# Patient Record
Sex: FEMALE | Race: WHITE
[De-identification: names, ages, dates, MRNs, and addresses within clinical notes are randomized per-mention and may not be internally consistent; named-entity substitution may affect disease eponyms.]

---

## 2018-03-21 ENCOUNTER — HOSPITAL ENCOUNTER (INPATIENT)
Dept: HOSPITAL 14 - H.EROB2 | Age: 23
LOS: 3 days | Discharge: HOME | DRG: 370 | End: 2018-03-24
Attending: OBSTETRICS & GYNECOLOGY | Admitting: OBSTETRICS & GYNECOLOGY
Payer: COMMERCIAL

## 2018-03-21 VITALS — BODY MASS INDEX: 36.6 KG/M2

## 2018-03-21 DIAGNOSIS — O34.211: ICD-10-CM

## 2018-03-21 DIAGNOSIS — Z3A.39: ICD-10-CM

## 2018-03-21 DIAGNOSIS — N85.8: ICD-10-CM

## 2018-03-21 LAB
BASOPHILS # BLD AUTO: 0 K/UL (ref 0–0.2)
BASOPHILS NFR BLD: 0.3 % (ref 0–2)
EOSINOPHIL # BLD AUTO: 0.1 K/UL (ref 0–0.7)
EOSINOPHIL NFR BLD: 0.6 % (ref 0–4)
ERYTHROCYTE [DISTWIDTH] IN BLOOD BY AUTOMATED COUNT: 17.4 % (ref 11.5–14.5)
HGB BLD-MCNC: 9.8 G/DL (ref 12–16)
LYMPHOCYTES # BLD AUTO: 1.8 K/UL (ref 1–4.3)
LYMPHOCYTES NFR BLD AUTO: 17.3 % (ref 20–40)
MCH RBC QN AUTO: 23.3 PG (ref 27–31)
MCHC RBC AUTO-ENTMCNC: 31.6 G/DL (ref 33–37)
MCV RBC AUTO: 73.8 FL (ref 81–99)
MONOCYTES # BLD: 0.6 K/UL (ref 0–0.8)
MONOCYTES NFR BLD: 6.2 % (ref 0–10)
NEUTROPHILS # BLD: 7.7 K/UL (ref 1.8–7)
NEUTROPHILS NFR BLD AUTO: 75.6 % (ref 50–75)
NRBC BLD AUTO-RTO: 0 % (ref 0–0)
PLATELET # BLD: 249 K/UL (ref 130–400)
PMV BLD AUTO: 8.3 FL (ref 7.2–11.7)
RBC # BLD AUTO: 4.23 MIL/UL (ref 3.8–5.2)
WBC # BLD AUTO: 10.2 K/UL (ref 4.8–10.8)

## 2018-03-21 PROCEDURE — 4A1HXCZ MONITORING OF PRODUCTS OF CONCEPTION, CARDIAC RATE, EXTERNAL APPROACH: ICD-10-PCS | Performed by: OBSTETRICS & GYNECOLOGY

## 2018-03-21 RX ADMIN — SIMETHICONE CHEW TAB 80 MG SCH MG: 80 TABLET ORAL at 22:06

## 2018-03-21 RX ADMIN — OXYCODONE HYDROCHLORIDE AND ACETAMINOPHEN PRN TAB: 5; 325 TABLET ORAL at 19:47

## 2018-03-21 NOTE — OP
PROCEDURE DATE:  2018



PREOPERATIVE DIAGNOSES:  Intrauterine pregnancy at 39 weeks 6 days, history

of previous  section, active labor, 5 cm dilated, and declined

trial of labor.



PROCEDURE:  Repeat low-flap transverse  section via Pfannenstiel

skin incision.



SURGEON:  Jo-Ann Rogers MD



ASSISTANT:  Puma Álvarez DO, he was helpful in creating exposure, obtaining

hemostasis, delivery of the infant and closure of the patient.  Procedure

would not have been possible without his assistance.



TYPE OF ANESTHESIA:  Spinal.



ANESTHESIA ADMINISTERED BY:  Joey Zelaya MD



ESTIMATED BLOOD LOSS:  800 mL



URINE OUTPUT:  Rutledge catheter put out approximately 250 mL.



IV FLUID INTAKE:  The patient received approximately 1200 mL of D5 LR

intraoperatively.



OPERATIVE FINDINGS:  Baby, vertex presentation, Apgars 9 and 9, weighing

3440 g, and normal uterus, tubes and ovaries were identified.



DESCRIPTION OF PROCEDURE:  After informed consent was obtained, the patient

was taken to the operating room where she was given spinal anesthesia.  The

patient was then prepped and draped in a normal sterile fashion.  A

Pfannenstiel skin incision was then made with a scalpel and carried down to

the underlying layer of the fascia.  The fascia was nicked in the midline. 

The fascial incision was then extended laterally with the curved Malik

scissors.  Superior aspect of the fascial incision was then grasped with

Kocher clamps, elevated up, and the rectus muscles were dissected off using

both sharp and blunt dissection.  Attention was then turned to the inferior

aspect of the fascial incision, which in a similar fashion was grasped with

Kocher clamps, elevated up, and the rectus muscles were dissected off using

both sharp and blunt dissection.  The rectus muscles were then  in

the midline.  The peritoneum identified and then entered sharply with the

Metzenbaum scissors.  The peritoneal incision was then extended superiorly

and inferiorly with good visualization of the bladder.  The bladder blade

was inserted.  The vesicouterine peritoneum was identified and entered

sharply with the Metzenbaum scissors.  The incision was then extended

laterally.  The bladder flap was created digitally.  The bladder blade was

then readjusted.  A low-transverse incision was then made with the scalpel.

The uterine incision was then extended laterally with the bandage scissors.

The infant was then delivered atraumatically.  The nose and mouth were

suctioned with Delee suction trap.  The cord was clamped and cut.  The

infant was handed off to awaiting pediatricians.  The placenta was then

removed manually.  The uterus was exteriorized and cleared of all clots and

debris.  The uterine incision was repaired with 0 Vicryl in a running

fashion.  The second layer of the same suture was used to obtain excellent

hemostasis.  The abdomen was then copiously irrigated.  The irrigant was

removed with a suction device.  Hemostasis was noted.  The gutters were

then cleared off all clots and debris.  The uterus was then returned to the

abdomen.  The incision was examined.  Hemostasis was noted.  The peritoneum

was then closed with 2-0 Vicryl in a running fashion.  The muscle was

re-approximated with 0 Vicryl in an interrupted fashion.  The fascia was

closed with 0 Vicryl in a running fashion and the skin was closed with 4-0

on a Josiah needle.  All sponge, lap, needle, and instrument counts were

correct x2 and the patient was taken to the recovery room in awake and

stable condition.





__________________________________________

Jo-Ann Rogers MD





DD:  2018 17:06:54

DT:  2018 19:25:10

Job # [id number]





ALEXSANDRA

## 2018-03-21 NOTE — OBDS
===================================

DELIVERY PERSONNEL

===================================

   

Nurse Midwife Certified:  na

Delivery Doctor:  JUDSON Rogers MD

Scrub Nurse:  Linda Landrum OBT

Circulator:  Sherry Jean RN/Sabrina Bullock

Anesthesiologist:  

Anesthetist:  michael

Resident:  

   

===================================

MATERNAL INFORMATION

===================================

   

Delivery Anesthesia:  Spinal

Medications in Delivery:  Pitocin 30 units in 500 cc LR at 999 cc/hr.

Estimated  Blood Loss (ml):  800

Placenta Cultured:  No

Maternal Complications:  None

Provider Comments:  See operative report

   

===================================

LABOR SUMMARY

===================================

   

EDC:  2018 00:00

No. Babies in Womb:  1

 Attempted:  No

Labor Anesthesia:  None

   

===================================

LABOR INFORMATION

===================================

   

Reason for Induction:  Not Applicable

Onset of Labor:  2018 21:30

Oxytocin:  N/A

Group B Beta Strep:  Negative

Steroids Given:  None

Reason Steroids Not Administered:  Not Applicable

   

===================================

MEMBRANES

===================================

   

Membranes Rupture Method:  Artificial

Rupture of Membranes:  2018 13:41

Length of Rupture (hrs):  0.02

Amniotic Fluid Color:  Clear

Amniotic Fluid Amount:  Small

Amniotic Fluid Odor:  Normal

   

===================================

STAGES OF LABOR

===================================

   

Stage 3 hrs:  0

Stage 3 min:  1

Total Time in Labor hrs:  16

Total Time in Labor min:  13

   

===================================

CSECTION DELIVERY

===================================

   

Primary Indication:  Repeat Elective

Secondary Indication:  Repeat Elective

CSection Urgency:  Elective

CSection Incidence:  Repeat

Labor:  N/A

Elective:  Elective

CSection Incision:  Lower Uterine Transverse

   

===================================

BABY A INFORMATION

===================================

   

Infant Delivery Date/Time:  2018 13:42

Method of Delivery:  

Born in Route :  No

:  N/A

Forceps:  N/A

Vacuum Extraction:  N/A

Shoulder Dystocia :  No

   

===================================

SHOULDER DYSTOCIA BABY A

===================================

   

Infant Delivery Date/Time:  2018 13:42

   

===================================

PRESENTATION/POSITION BABY A

===================================

   

Presentation:  Cephalic

Breech Presentation:  N/A

   

===================================

PLACENTA INFORMATION BABY A

===================================

   

Placenta Delivery Time :  2018 13:43

Placenta Method of Delivery:  Manual Removal

Placenta Status:  Delivered

   

===================================

APGAR SCORES BABY A

===================================

   

Heart Rate 1 min:  >100 bpm

Resp Effort 1 min:  Good Cry

Reflex Irritability 1 min:  Cough or Sneeze or Pulls Away

Muscle Tone 1 min:  Active Motion

Color 1 min:  Body Pink, Extremities Blue

Resuscitation Effort 1 min:  Tactile Stimulation

APGAR SCORE 1 MIN:  9

Heart Rate 5 min:  >100 bpm

Resp Effort 5 min:  Good Cry

Reflex Irritability 5 min:  Cough or Sneeze or Pulls Away

Muscle Tone 5 min:  Active Motion

Color 5 min:  Body Pink, Extremities Blue

Resuscitation Effort 5 min:  Tactile Stimulation

APGAR SCORE 5 MIN:  9

   

===================================

INFANT INFORMATION BABY A

===================================

   

Gestational Age at Delivery:  39.6

Gestational Status:  Term

Infant Outcome :  Liveborn

Infant Condition :  Stable

Infant Sex:  Male

   

===================================

IDENTIFICATION/MEDS BABY A

===================================

   

ID Band Number:  [id number]

ID Band Location:  Left Leg; Left Arm



Vitamin K Given :  Not Given

Erythromycin Given:  Not Given

   

===================================

WEIGHT/LENGTH BABY A

===================================

   

Infant Birthweight (gms):  3440

Infant Weight (lb):  7

Infant Weight (oz):  9

Infant Length Inches:  20.50

Infant Length cms:  52.1

   

===================================

CORD INFORMATION BABY A

===================================

   

No. Cord Vessels:  3

Nuchal Cord :  Around Neck x1, Loose

Cord Blood Taken:  Yes

Infant Suction:  Mouth

   

===================================

ASSESSMENT BABY A

===================================

   

Infant Complications:  None

Physical Findings at Delivery:  Within Normal Limits

Infant Respirations:  Appears Normal

Neonatologist/ALS Called :  No

Infant Care By:  /Sabrina Tristan

Transferred To:  Remains with Mother

## 2018-03-22 LAB
ERYTHROCYTE [DISTWIDTH] IN BLOOD BY AUTOMATED COUNT: 17.3 % (ref 11.5–14.5)
HGB BLD-MCNC: 8.9 G/DL (ref 12–16)
MCH RBC QN AUTO: 23.2 PG (ref 27–31)
MCHC RBC AUTO-ENTMCNC: 32.4 G/DL (ref 33–37)
MCV RBC AUTO: 71.6 FL (ref 81–99)
PLATELET # BLD: 199 K/UL (ref 130–400)
RBC # BLD AUTO: 3.86 MIL/UL (ref 3.8–5.2)
WBC # BLD AUTO: 13.4 K/UL (ref 4.8–10.8)

## 2018-03-22 RX ADMIN — OXYCODONE HYDROCHLORIDE AND ACETAMINOPHEN PRN TAB: 5; 325 TABLET ORAL at 04:08

## 2018-03-22 RX ADMIN — SIMETHICONE CHEW TAB 80 MG SCH MG: 80 TABLET ORAL at 10:19

## 2018-03-22 RX ADMIN — SIMETHICONE CHEW TAB 80 MG SCH MG: 80 TABLET ORAL at 21:34

## 2018-03-22 RX ADMIN — SIMETHICONE CHEW TAB 80 MG SCH MG: 80 TABLET ORAL at 16:56

## 2018-03-22 RX ADMIN — SIMETHICONE CHEW TAB 80 MG SCH MG: 80 TABLET ORAL at 03:50

## 2018-03-22 RX ADMIN — OXYCODONE HYDROCHLORIDE AND ACETAMINOPHEN PRN TAB: 5; 325 TABLET ORAL at 19:34

## 2018-03-22 NOTE — OBPPN
===========================

Datetime: 2018 06:54

===========================

   

PP Pain Prov:  Within normal limits

PP Nausea Prov:  Denies

PP Flatus Prov:  No

PP BM Prov:  No

PP Heart Prov:  Normal

PP Lungs Prov:  Normal

PP Abdomen/Uterus Prov:  Normal

PP Lochia Prov:  Normal

PP CVA Tenderness Prov:  Normal

PP Extremities Prov:  Normal

PP C/S Incision Prov:  Normal

PP Breastfeeding Progress Prov:  Normal

PP Impression Prov:  Normal postpartum progression

PP Plan Prov:  Continue present management

PP Progress Note Prov:  21 y/o F now , s/p  on 18. Pt reports feeling well. Pain 
around surgical incision and pelvis is well controlled with medication. Pt afebrile, vomited once las
t night at 9pm after Cranberry juice intake. Pt has not ambulated. Pt voiding thorugh Rutledge, not pass
ing gases and NO bowel movement yet. Pt breastfeeding with NO difficulties. Lochia same as menses. Pt
 denies headache, dizziness, CP, SOB, N/V or calf tenderness

   All systems reviewed and negative except as above.

      

   O:

   PE: 

   -Gen: A_O, resting comfortably on bed, NAD.

   -Lungs: CTAB, No W/R/R.

   -CV: RRR, S1 and S2 present.

   -ABD: soft, BS +, firm fundus below umbilicus. Incision on dressing that is clean, dry and intact

   -EXT: No cyanosis, non-tender calves.

      

   A/P:

   21 y/o F on POD 1, stable, recovering well from .

   --Incision dressing to be removed later today.

   --Continue with post-partum management.

   --Breastfeeding and ambulation encouraged. 

   --Anticipated discharge 18. 

   Case discussed with OB on call.

   Carrie PGY-1

      

   OB Hospitalist Addendum: Pt seen and examined by me.  Agree w/ above.  POD 1 s/p Repeat c/s, doing
 well, breast and bottle feeding.  Incision w/ bandage in place.  Encourage OOB today.  (ES)    

IP PP Procedures:  None

Vital Signs Provider PP:  Reviewed

## 2018-03-23 RX ADMIN — SIMETHICONE CHEW TAB 80 MG SCH MG: 80 TABLET ORAL at 04:45

## 2018-03-23 RX ADMIN — SIMETHICONE CHEW TAB 80 MG SCH MG: 80 TABLET ORAL at 09:46

## 2018-03-23 RX ADMIN — SIMETHICONE CHEW TAB 80 MG SCH MG: 80 TABLET ORAL at 21:40

## 2018-03-23 RX ADMIN — SIMETHICONE CHEW TAB 80 MG SCH MG: 80 TABLET ORAL at 18:10

## 2018-03-23 NOTE — OBPPN
===========================

Datetime: 2018 06:36

===========================

   

PP Pain Prov:  Within normal limits

PP Nausea Prov:  Denies

PP Flatus Prov:  Yes

PP BM Prov:  No

PP Heart Prov:  Normal

PP Lungs Prov:  Normal

PP Abdomen/Uterus Prov:  Normal

PP Lochia Prov:  Normal

PP CVA Tenderness Prov:  Normal

PP Extremities Prov:  Normal

PP C/S Incision Prov:  Not Applicable

PP Breastfeeding Progress Prov:  Normal

PP Impression Prov:  Normal postpartum progression

PP Plan Prov:  Continue present management

PP Progress Note Prov:  23 y/o F now , s/p  on 18. Pt reports feeling well. Pain 
around surgical incision and pelvis is well controlled with medication. Pt afebrile, tolerating PO. P
t ambulating with difficulties, needs assitance. Pt voiding with NO issues, not passing gases and NO 
bowel movement yet. Pt breastfeeding with NO difficulties. Lochia less than menses. Pt denies headach
e, dizziness, CP, SOB, N/V or calf tenderness

   All systems reviewed and negative except as above.

      

   O:

   PE: 

   -Gen: A_O, resting comfortably on bed, NAD.

   -Lungs: CTAB, No W/R/R.

   -CV: RRR, S1 and S2 present.

   -ABD: soft, BS +, firm fundus at umbilicus. Incision is clean, dry and intact

   -EXT: No cyanosis, non-tender calves.

      

   A/P:

   23 y/o F on POD 2, stable, recovering well from .

   --Incision dressing removed, pt tolerated well.

   --Continue with post-partum management.

   --Breastfeeding and ambulation encouraged. 

   --Anticipated discharge 18. 

   Case discussed with OB on call.

   Carrie PGY-1

      

      

   OB Hospitalist on-call.  Pt seen and examined on rounds 10am...agree wtih PGY1 note...PRISCA RAMOS PP Procedures:  None

Vital Signs Provider PP:  Reviewed

## 2018-03-24 VITALS
OXYGEN SATURATION: 97 % | DIASTOLIC BLOOD PRESSURE: 69 MMHG | RESPIRATION RATE: 20 BRPM | HEART RATE: 68 BPM | SYSTOLIC BLOOD PRESSURE: 130 MMHG | TEMPERATURE: 98.2 F

## 2018-03-24 RX ADMIN — SIMETHICONE CHEW TAB 80 MG SCH MG: 80 TABLET ORAL at 09:22

## 2018-03-24 RX ADMIN — SIMETHICONE CHEW TAB 80 MG SCH: 80 TABLET ORAL at 05:35

## 2018-03-24 NOTE — OBDCSUM
===========================

Datetime: 03/24/2018 06:16

===========================

   

Discharged to, Provider:  Home

Follow up at, Provider:  [facility]

Disch Instr Activity:  Normal activity; May be up to bathroom; May be up for meals; May Shower

Disch Instr Diet:  Regular

Discharge Instructions, Provider:  Routine instructions given

Discharge Diagnosis, Provider:  Term Pregnancy Delivered

Discharge Time:  03/24/2018 06:16

Follow up in weeks, Provider:  1 week, 6 weeks

Disch Referrals:  None

Disch Activity Restrictions:  No lifting; No driving; Minimize stair-climbing; No sexual activity; No
thing in vagina - [place], tampons, douche

Discharge Comment, Provider:  --F/u with PCP in 1 week for wound check and in 6 weeks for post-partum
 examination.

   --Rx Percocet and Ibuprofen for pain, Feosol for anemia and Senokot for bowel movement enhancement
.

   --Breastfeeding and ambulation encouraged. 

   --Pt instructed to got to ER if fever, intolerable pain, nausea or profuse vaginal bleeding.

## 2018-03-24 NOTE — OBPPN
===========================

Datetime: 2018 06:15

===========================

   

PP Pain Prov:  Within normal limits

PP Nausea Prov:  Denies

PP Flatus Prov:  Yes

PP BM Prov:  Yes

PP Heart Prov:  Normal

PP Lungs Prov:  Normal

PP Abdomen/Uterus Prov:  Normal

PP Lochia Prov:  Normal

PP CVA Tenderness Prov:  Normal

PP Extremities Prov:  Normal

PP Impression Prov:  Normal postpartum progression

PP Plan Prov:  Discharge

PP Progress Note Prov:  23 y/o F now , s/p  on 18. Pt reports feeling well. Pain 
around surgical incision and pelvis is well controlled with medication. Pt afebrile, tolerating PO an
d ambulating. Pt voiding with NO issues, passing gases and had 2 bowel movements yesterday. Pt breast
feeding with NO difficulties. Lochia less than menses. Pt denies headache, dizziness, CP, SOB, N/V or
 calf tenderness

   All systems reviewed and negative except as above.

      

   O:

   PE: 

   -Gen: A_O, resting comfortably on bed, NAD.

   -Lungs: CTAB, No W/R/R.

   -CV: RRR, S1 and S2 present.

   -ABD: soft, BS +, firm fundus below umbilicus. Incision is clean, dry and intact with sterile stri
ps in place.

   -EXT: No cyanosis, non-tender calves.

      

   A/P:

   23 y/o F on POD 2, stable, recovering well from .

   --Will discharge home today.

   --F/u with PCP in 1 week for wound check and in 6 weeks for post-partum examination.

   --Rx Percocet and Ibuprofen for pain, Feosol for anemia and Senokot for bowel movement enhancement
.

   --Breastfeeding and ambulation encouraged. 

   --Pt instructed to got to ER if fever, intolerable pain, nausea or profuse vaginal bleeding. 

   Case discussed with OB on call 

   Carrie PGY-1

IP PP Procedures:  None

Vital Signs Provider PP:  Reviewed